# Patient Record
Sex: MALE | URBAN - METROPOLITAN AREA
[De-identification: names, ages, dates, MRNs, and addresses within clinical notes are randomized per-mention and may not be internally consistent; named-entity substitution may affect disease eponyms.]

---

## 2020-03-26 ENCOUNTER — PATIENT OUTREACH (OUTPATIENT)
Dept: OTHER | Age: 60
End: 2020-03-26

## 2020-03-26 NOTE — PROGRESS NOTES
Incoming email referral from Taniya Farr at [facility] requesting assistance with Care Coordination for this patient, newly diagnosed with MS secondary to gradual weakness over past month - was in midst of work-up;  Taniya Farr provided additional contact information with the inpatient CM, Gabino Padilla RN, at [facility]; Outbound call by this CM to Kamaljit Moore who provided additional information for this patient since admission:    61 yo male admitted into ICU at [facility] facility initially for Urosepsis, but now increasing symptoms are suspicious for COVID-19 virus; Not responding to any current analgesics or other treatment; Awaiting results of pending COVID-19 testing; Patient is transferring to a tertiary care hospital in [facility], awaiting [employer] transport now; Noted patient resides permanently in [place]; He and wife are living apart, as she is a practicing ICU RN in [address] [place] [address] with [place] [address]; However, identified barrier to CM engagement as the patient lives in [place] [facility] [place]; While this CM holds RN  multi-state compact licensure, [place] [facility] state is not a part of the compact;   Discussed with Lead CM, Justin Cage RN, who agreed this CM is unable to outreach to patient or his wife at this time;

## 2020-10-27 ENCOUNTER — TELEPHONE (OUTPATIENT)
Dept: PHARMACY | Age: 60
End: 2020-10-27

## 2020-10-27 NOTE — TELEPHONE ENCOUNTER
Received call from [facility] at [facility] in [facility]. Reports they received over-ride from [address] to fill patient's Toujeo, but that [facility] advised them to call and ask for over-ride through end of year since he already has them for atorvastatin, lisinopril and Trulicity. Per [facility], patient was told that [facility] cannot mail to [place], and it appears patient may likely need over-ride for metformin too. Email to Pharmacy Benefits of above. Response/reply from pharmacy benefits: Is he the only one that lives in [place]? If so, I can place the override. However, if his entire family (wife is the employee) moved, she needs to update her address in [place] [address]. Then, the entire family would be placed in a group that isnt subject to the one fill rule. No phone # on file in [address]; left message on phone # on file in this Epic. [address] back, states they also fill for patient's father, but are unsure if they fill for anyone else in his immediate family and/or if anyone else in his immediate family are living in [place]. They have phone #[phone] for patient. Called that # and reached patient. States his entire family is in [facility] and they think it will be permanent. Explained above re having wife/employee update address, so then over-rides would not be necessary. Patient states he will tell wife. Also advised that I left my contact information on the 394#, which he reports is his wife's, that she can call if questions. Confirmation received from pharmacy benefits that metformin and Toujeo over-rides placed through end of year.     Marlon Ravi, MairaD, [facility]  Direct: [phone]  Department, toll free: [phone], option 7      =========================================================   For Pharmacy Admin Tracking Only    PHSO: Rizwan Javier [id number] Patient?: Yes  Total # of Interventions Recommended: Count: 1  - Updated Order #: 1 Updated Order Reason(s):  Other  Recommended intervention potential cost savings: 1  Total Interventions Accepted: 1  Time Spent (min): 35